# Patient Record
Sex: MALE | Race: BLACK OR AFRICAN AMERICAN | URBAN - METROPOLITAN AREA
[De-identification: names, ages, dates, MRNs, and addresses within clinical notes are randomized per-mention and may not be internally consistent; named-entity substitution may affect disease eponyms.]

---

## 2021-09-22 ENCOUNTER — OFFICE VISIT (OUTPATIENT)
Dept: URBAN - METROPOLITAN AREA CLINIC 98 | Facility: CLINIC | Age: 56
End: 2021-09-22

## 2021-10-19 ENCOUNTER — OFFICE VISIT (OUTPATIENT)
Dept: URBAN - METROPOLITAN AREA CLINIC 98 | Facility: CLINIC | Age: 56
End: 2021-10-19

## 2022-03-09 ENCOUNTER — OFFICE VISIT (OUTPATIENT)
Dept: URBAN - METROPOLITAN AREA CLINIC 98 | Facility: CLINIC | Age: 57
End: 2022-03-09
Payer: MEDICARE

## 2022-03-09 ENCOUNTER — WEB ENCOUNTER (OUTPATIENT)
Dept: URBAN - METROPOLITAN AREA CLINIC 98 | Facility: CLINIC | Age: 57
End: 2022-03-09

## 2022-03-09 VITALS
HEART RATE: 76 BPM | DIASTOLIC BLOOD PRESSURE: 101 MMHG | BODY MASS INDEX: 38.36 KG/M2 | HEIGHT: 76 IN | TEMPERATURE: 97.5 F | WEIGHT: 315 LBS | SYSTOLIC BLOOD PRESSURE: 148 MMHG

## 2022-03-09 DIAGNOSIS — Z12.11 COLON CANCER SCREENING: ICD-10-CM

## 2022-03-09 PROCEDURE — 99242 OFF/OP CONSLTJ NEW/EST SF 20: CPT | Performed by: INTERNAL MEDICINE

## 2022-03-09 PROCEDURE — 993 AGA: Performed by: INTERNAL MEDICINE

## 2022-03-09 RX ORDER — SODIUM, POTASSIUM,MAG SULFATES 17.5-3.13G
177 ML SOLUTION, RECONSTITUTED, ORAL ORAL AS DIRECTED
Qty: 1 BOX | Refills: 0 | OUTPATIENT
Start: 2022-03-09 | End: 2022-03-10

## 2022-03-09 NOTE — HPI-TODAY'S VISIT:
The patient was referred by Dr. Gabbi Manzano for CRC screening.   A copy of this document is being forwarded to the referring provider.  56 y.o. AAM, , 2 children, works in appliance repairs No abd pain No blood in stool No N/V No heartburn No reflux

## 2022-05-18 ENCOUNTER — OFFICE VISIT (OUTPATIENT)
Dept: URBAN - METROPOLITAN AREA SURGERY CENTER 18 | Facility: SURGERY CENTER | Age: 57
End: 2022-05-18

## 2022-06-15 PROBLEM — 305058001: Status: ACTIVE | Noted: 2022-06-15

## 2022-07-26 ENCOUNTER — OFFICE VISIT (OUTPATIENT)
Dept: URBAN - METROPOLITAN AREA SURGERY CENTER 18 | Facility: SURGERY CENTER | Age: 57
End: 2022-07-26
Payer: MEDICARE

## 2022-07-26 ENCOUNTER — CLAIMS CREATED FROM THE CLAIM WINDOW (OUTPATIENT)
Dept: URBAN - METROPOLITAN AREA CLINIC 4 | Facility: CLINIC | Age: 57
End: 2022-07-26
Payer: MEDICARE

## 2022-07-26 DIAGNOSIS — D12.3 BENIGN NEOPLASM OF TRANSVERSE COLON: ICD-10-CM

## 2022-07-26 DIAGNOSIS — Z12.11 COLON CANCER SCREENING: ICD-10-CM

## 2022-07-26 DIAGNOSIS — K63.89 OTHER SPECIFIED DISEASES OF INTESTINE: ICD-10-CM

## 2022-07-26 DIAGNOSIS — D12.3 ADENOMA OF TRANSVERSE COLON: ICD-10-CM

## 2022-07-26 DIAGNOSIS — K62.1 ANAL AND RECTAL POLYP: ICD-10-CM

## 2022-07-26 PROCEDURE — G8907 PT DOC NO EVENTS ON DISCHARG: HCPCS | Performed by: INTERNAL MEDICINE

## 2022-07-26 PROCEDURE — 45385 COLONOSCOPY W/LESION REMOVAL: CPT | Performed by: INTERNAL MEDICINE

## 2022-07-26 PROCEDURE — 88305 TISSUE EXAM BY PATHOLOGIST: CPT | Performed by: PATHOLOGY

## 2024-01-24 ENCOUNTER — OFFICE VISIT (OUTPATIENT)
Dept: URBAN - METROPOLITAN AREA CLINIC 50 | Facility: CLINIC | Age: 59
End: 2024-01-24

## 2024-01-26 ENCOUNTER — OFFICE VISIT (OUTPATIENT)
Dept: URBAN - METROPOLITAN AREA CLINIC 50 | Facility: CLINIC | Age: 59
End: 2024-01-26

## 2024-02-09 ENCOUNTER — OV EP (OUTPATIENT)
Dept: URBAN - METROPOLITAN AREA CLINIC 50 | Facility: CLINIC | Age: 59
End: 2024-02-09

## 2024-02-12 ENCOUNTER — OV EP (OUTPATIENT)
Dept: URBAN - METROPOLITAN AREA CLINIC 50 | Facility: CLINIC | Age: 59
End: 2024-02-12

## 2024-02-14 ENCOUNTER — OV EP (OUTPATIENT)
Dept: URBAN - METROPOLITAN AREA CLINIC 50 | Facility: CLINIC | Age: 59
End: 2024-02-14

## 2024-02-23 ENCOUNTER — OV EP (OUTPATIENT)
Dept: URBAN - METROPOLITAN AREA CLINIC 50 | Facility: CLINIC | Age: 59
End: 2024-02-23

## 2024-02-27 ENCOUNTER — OV EP (OUTPATIENT)
Dept: URBAN - METROPOLITAN AREA CLINIC 50 | Facility: CLINIC | Age: 59
End: 2024-02-27

## 2024-02-28 ENCOUNTER — OV EP (OUTPATIENT)
Dept: URBAN - METROPOLITAN AREA CLINIC 50 | Facility: CLINIC | Age: 59
End: 2024-02-28
Payer: MEDICARE

## 2024-02-28 VITALS
DIASTOLIC BLOOD PRESSURE: 87 MMHG | SYSTOLIC BLOOD PRESSURE: 130 MMHG | BODY MASS INDEX: 38.36 KG/M2 | WEIGHT: 315 LBS | TEMPERATURE: 97.8 F | HEIGHT: 76 IN | HEART RATE: 52 BPM

## 2024-02-28 DIAGNOSIS — Z86.010 HX OF COLONIC POLYPS: ICD-10-CM

## 2024-02-28 DIAGNOSIS — F41.9 ANXIOUSNESS: ICD-10-CM

## 2024-02-28 DIAGNOSIS — R19.4 CHANGE IN BOWEL HABIT: ICD-10-CM

## 2024-02-28 DIAGNOSIS — R10.9 ABDOMINAL DISTRESS: ICD-10-CM

## 2024-02-28 DIAGNOSIS — R14.0 BLOATING: ICD-10-CM

## 2024-02-28 DIAGNOSIS — R19.5 LOOSE STOOLS: ICD-10-CM

## 2024-02-28 PROBLEM — 88111009: Status: ACTIVE | Noted: 2024-02-28

## 2024-02-28 PROBLEM — 48694002: Status: ACTIVE | Noted: 2024-02-28

## 2024-02-28 PROBLEM — 408512008: Status: ACTIVE | Noted: 2024-02-28

## 2024-02-28 PROBLEM — 428283002: Status: ACTIVE | Noted: 2024-02-28

## 2024-02-28 PROBLEM — 398032003: Status: ACTIVE | Noted: 2024-02-28

## 2024-02-28 PROCEDURE — 99214 OFFICE O/P EST MOD 30 MIN: CPT | Performed by: PHYSICIAN ASSISTANT

## 2024-02-28 RX ORDER — DICYCLOMINE HYDROCHLORIDE 20 MG/1
1 TABLET TABLET ORAL THREE TIMES A DAY
Qty: 30 TABLET | Refills: 1 | OUTPATIENT
Start: 2024-02-28 | End: 2024-03-19

## 2024-02-28 NOTE — HPI-TODAY'S VISIT:
2/28/24: Patient is 57 y/o M of [facility] here for discussion on increased bowel frequency x 2 months BMs increased now to 2-4x/day, loose/diarrhea, urgent at times, discomfort/bloating after eating No bloody stool/mucus/melena Knows certain diet triggers - milk sensitive, but feels this is even worse and more sensitive to cheese in 2 months Discussed fodmaps -- diet w high fodmap fruits frequently, apples, peaches, pears, avocado No abdominal pains, no nausea/vomiting Post prandial gas discomfort/bloating, generalized discomfort, no pain No nausea/vomiting/indigestion/heartburn No new GI illnesses, no new antibiotics, no new meds Has had labs w [facility] in past 6 months per his report -- unsure on results but has a physical next week scheduled Denies abnormal weight loss Worried prostate cancer or stomach cancer causing bowel habit change Up to date on colonoscopy -- 3/9/22: The patient was referred by Dr. Gabbi Manzano for CRC screening.   A copy of this document is being forwarded to the referring provider.  56 y.o. AAM, , 2 children, works in appliance repairs No abd pain No blood in stool No N/V No heartburn No reflux

## 2024-02-28 NOTE — PHYSICAL EXAM GASTROINTESTINAL
Abdomen , soft, nontender, mildly distended , no guarding or rigidity , no masses palpable , normal bowel sounds , Liver and Spleen,  no hepatosplenomegaly , liver nontender

## 2024-03-28 ENCOUNTER — TELEP (OUTPATIENT)
Dept: URBAN - METROPOLITAN AREA CLINIC 50 | Facility: CLINIC | Age: 59
End: 2024-03-28

## 2024-03-28 NOTE — HPI-TODAY'S VISIT:
3/28/24:  -- 2/28/24: Patient is 59 y/o M of [facility] here for discussion on increased bowel frequency x 2 months BMs increased now to 2-4x/day, loose/diarrhea, urgent at times, discomfort/bloating after eating No bloody stool/mucus/melena Knows certain diet triggers - milk sensitive, but feels this is even worse and more sensitive to cheese in 2 months Discussed fodmaps -- diet w high fodmap fruits frequently, apples, peaches, pears, avocado No abdominal pains, no nausea/vomiting Post prandial gas discomfort/bloating, generalized discomfort, no pain No nausea/vomiting/indigestion/heartburn No new GI illnesses, no new antibiotics, no new meds Has had labs w [facility] in past 6 months per his report -- unsure on results but has a physical next week scheduled Denies abnormal weight loss Worried prostate cancer or stomach cancer causing bowel habit change Up to date on colonoscopy -- 3/9/22: The patient was referred by Dr. Gabbi Manzano for CRC screening.   A copy of this document is being forwarded to the referring provider.  56 y.o. AAM, , 2 children, works in appliance repairs No abd pain No blood in stool No N/V No heartburn No reflux